# Patient Record
Sex: FEMALE | Race: WHITE | Employment: FULL TIME | URBAN - METROPOLITAN AREA
[De-identification: names, ages, dates, MRNs, and addresses within clinical notes are randomized per-mention and may not be internally consistent; named-entity substitution may affect disease eponyms.]

---

## 2017-01-12 ENCOUNTER — TELEPHONE (OUTPATIENT)
Dept: SURGERY | Age: 27
End: 2017-01-12

## 2017-01-12 NOTE — TELEPHONE ENCOUNTER
I called the patient and she said she is having reflux even with water. I asked her was she taking her Protonix and she said no, nor is she taking the Levsin or the Carafate. I told her to start taking her Prilosec for a few days to see if that helps and if not add back the Levsin and Carafate. Doc Miller NP also said for her to try Regalamos. Pt in agreement and will call back in a few days if this does not help.

## 2017-03-23 ENCOUNTER — OFFICE VISIT (OUTPATIENT)
Dept: SURGERY | Age: 27
End: 2017-03-23

## 2017-03-23 VITALS
SYSTOLIC BLOOD PRESSURE: 112 MMHG | HEART RATE: 67 BPM | HEIGHT: 66 IN | BODY MASS INDEX: 29.25 KG/M2 | WEIGHT: 182 LBS | DIASTOLIC BLOOD PRESSURE: 60 MMHG | TEMPERATURE: 98.3 F | OXYGEN SATURATION: 99 % | RESPIRATION RATE: 16 BRPM

## 2017-03-23 DIAGNOSIS — D64.9 ANEMIA, UNSPECIFIED: ICD-10-CM

## 2017-03-23 DIAGNOSIS — E55.9 VITAMIN D DEFICIENCY: ICD-10-CM

## 2017-03-23 DIAGNOSIS — Z98.84 S/P LAPAROSCOPIC SLEEVE GASTRECTOMY: ICD-10-CM

## 2017-03-23 DIAGNOSIS — E66.3 OVERWEIGHT: ICD-10-CM

## 2017-03-23 DIAGNOSIS — B37.2 YEAST DERMATITIS: ICD-10-CM

## 2017-03-23 DIAGNOSIS — E66.01 MORBID OBESITY DUE TO EXCESS CALORIES (HCC): Primary | ICD-10-CM

## 2017-03-23 DIAGNOSIS — E53.8 VITAMIN B12 DEFICIENCY: ICD-10-CM

## 2017-03-23 RX ORDER — PHENOL/SODIUM PHENOLATE
20 AEROSOL, SPRAY (ML) MUCOUS MEMBRANE DAILY
Qty: 30 TAB | Refills: 5 | Status: SHIPPED | OUTPATIENT
Start: 2017-03-23

## 2017-03-23 RX ORDER — NYSTATIN 100000 U/G
CREAM TOPICAL 2 TIMES DAILY
Qty: 15 G | Refills: 1 | Status: SHIPPED | OUTPATIENT
Start: 2017-03-23

## 2017-03-23 NOTE — MR AVS SNAPSHOT
Visit Information Date & Time Provider Department Dept. Phone Encounter #  
 3/23/2017  9:20 AM Samson Treviño NP [phone] [id number] Upcoming Health Maintenance Date Due DTaP/Tdap/Td series (1 - Tdap) 1/11/2009 PAP AKA CERVICAL CYTOLOGY 1/11/2009 INFLUENZA AGE 9 TO ADULT 8/1/2016 Allergies as of 3/23/2017  Review Complete On: 3/23/2017 By: Samson Treviño NP Severity Noted Reaction Type Reactions Codeine  09/13/2016    Itching Current Immunizations  Reviewed on 9/29/2016 No immunizations on file. Not reviewed this visit You Were Diagnosed With   
  
 Codes Comments Overweight    -  Primary ICD-10-CM: T03.8 ICD-9-CM: 278.02   
 BMI 29.0-29.9,adult     ICD-10-CM: P64.44 ICD-9-CM: V85.25 S/P laparoscopic sleeve gastrectomy     ICD-10-CM: B85.06 ICD-9-CM: V45.86 Vitamin D deficiency     ICD-10-CM: E55.9 ICD-9-CM: 268.9 Vitamin B12 deficiency     ICD-10-CM: E53.8 ICD-9-CM: 266.2 Anemia, unspecified     ICD-10-CM: D64.9 ICD-9-CM: 285.9 Yeast dermatitis     ICD-10-CM: B37.2 ICD-9-CM: 112.3 Vitals BP Pulse Temp Resp Height(growth percentile) Weight(growth percentile) 112/60 (BP 1 Location: Left arm, BP Patient Position: Sitting) 67 98.3 °F (36.8 °C) (Oral) 16 5' 6\" (1.676 m) 182 lb (82.6 kg) SpO2 BMI OB Status Smoking Status 99% 29.38 kg/m2 IUD Former Smoker Vitals History BMI and BSA Data Body Mass Index Body Surface Area  
 29.38 kg/m 2 1.96 m 2 Preferred Pharmacy Pharmacy Name Phone [phone] [facility] Your Updated Medication List  
  
   
This list is accurate as of: 3/23/17 10:33 AM.  Always use your most recent med list.  
  
  
  
  
 CALCIUM CITRATE + D PO Take  by mouth. * VITAMIN D2 PO Take  by mouth. * ergocalciferol 50,000 unit capsule Commonly known as:  ERGOCALCIFEROL Take 1 Cap by mouth every seven (7) days. Indications: VITAMIN D DEFICIENCY (HIGH DOSE THERAPY)  
  
 gabapentin 300 mg capsule Commonly known as:  NEURONTIN Take 600 mg by mouth three (3) times daily. hyoscyamine SL 0.125 mg SL tablet Commonly known as:  LEVSIN/SL  
1 Tab by SubLINGual route every four (4) hours as needed for Cramping. levonorgestrel 20 mcg/24 hr (5 years) IUD Commonly known as:  MIRENA  
1 Each by IntraUTERine route once. loratadine 10 mg tablet Commonly known as:  Higinio Hippo Take 10 mg by mouth daily as needed for Allergies. LORazepam 0.5 mg tablet Commonly known as:  ATIVAN Take 1 Tab by mouth every six (6) hours as needed (nausea and chest pressure/spasm). Max Daily Amount: 2 mg. MULTIVITAMIN PO Take  by mouth daily. Using Celebrate vitamins  
  
 nystatin topical cream  
Commonly known as:  MYCOSTATIN Apply  to affected area two (2) times a day. Omeprazole delayed release 20 mg tablet Commonly known as:  PRILOSEC D/R Take 1 Tab by mouth daily. ondansetron 4 mg disintegrating tablet Commonly known as:  ZOFRAN ODT Take 1 Tab by mouth every eight (8) hours as needed for Nausea. OTHER  
emergency vitamin for cold. sucralfate 100 mg/mL suspension Commonly known as:  Wanette Pickerel Take 10 mL by mouth four (4) times daily. VITAMIN B-12 1,000 mcg sublingual tablet Generic drug:  cyanocobalamin Take 1,000 mcg by mouth daily. * Notice: This list has 2 medication(s) that are the same as other medications prescribed for you. Read the directions carefully, and ask your doctor or other care provider to review them with you. Prescriptions Sent to Pharmacy Refills  
 nystatin (MYCOSTATIN) topical cream 1 Sig: Apply  to affected area two (2) times a day.   
 Class: Normal  
 Pharmacy: [id number] [facility], [address] [place] [address] [facility] Ph #: [phone] Route: Topical  
 Omeprazole delayed release (PRILOSEC D/R) 20 mg tablet 5 Sig: Take 1 Tab by mouth daily. Class: Normal  
 Pharmacy: [id number] [facility] [address], [address] [place] Ph #: [phone] Route: Oral  
  
We Performed the Following CBC W/O DIFF [33939 CPT(R)] METABOLIC PANEL, COMPREHENSIVE [22516 CPT(R)] PREALBUMIN [10563 CPT(R)] VITAMIN B12 [id number] CPT(R)] VITAMIN D, 25 HYDROXY [id number] CPT(R)] Patient Instructions Learning About Physical Activity What is physical activity? Physical activity is any kind of activity that gets your body moving. The types of physical activity that can help you get fit and stay healthy include: · Aerobic or \"cardio\" activities that make your heart beat faster and make you breathe harder, such as brisk walking, riding a bike, or running. Aerobic activities strengthen your heart and lungs and build up your endurance. · Strength training activities that make your muscles work against, or \"resist,\" something, such as lifting weights or doing push-ups. These activities help tone and strengthen your muscles. · Stretches that allow you to move your joints and muscles through their full range of motion. Stretching helps you be more flexible and avoid injury. What are the benefits of physical activity? Being active is one of the best things you can do to get fit and stay healthy. It helps you to: · Feel stronger and have more energy to do all the things you like to do. · Focus better at school or work and perform better in sports. · Feel, think, and sleep better. · Reach and stay at a healthy weight. · Lose fat and build lean muscle. · Lower your risk for serious health problems. · Keep your bones, muscles, and joints strong. Being fit lets you do more physical activity. And it lets you work out harder without as much effort. How can you make physical activity part of your life? Get at least 30 minutes of exercise on most days of the week. Walking is a good choice. You also may want to do other activities, such as running, swimming, cycling, or playing tennis or team sports. Pick activities that you likeones that make your heart beat faster, your muscles stronger, and your muscles and joints more flexible. If you find more than one thing you like doing, do them all. You don't have to do the same thing every day. Get your heart pumping every day. Any activity that makes your heart beat faster and keeps it at that rate for a while counts. Here are some great ways to get your heart beating faster: · Go for a brisk walk, run, or bike ride. · Go for a hike or swim. · Go in-line skating. · Play a game of touch football, basketball, or soccer. · Ride a bike. · Play tennis or racquetball. · Climb stairs. Even some household chores can be aerobicjust do them at a faster pace. Vacuuming, raking or mowing the lawn, sweeping the garage, and washing and waxing the car all can help get your heart rate up. Strengthen your muscles during the week. You don't have to lift heavy weights or grow big, bulky muscles to get stronger. Doing a few simple activities that make your muscles work against, or \"resist,\" something can help you get stronger. For example, you can: · Do push-ups or sit-ups, which use your own body weight as resistance. · Lift weights or dumbbells or use stretch bands at home or in a gym or community center. Stretch your muscles often. Stretching will help you as you become more active. It can help you stay flexible, loosen tight muscles, and avoid injury. It can also help improve your balance and posture and can be a great way to relax. Be sure to stretch the muscles you'll be using when you work out. It's best to warm your muscles slightly before you stretch them.  Walk or do some other light aerobic activity for a few minutes, and then start stretching. When you stretch your muscles: · Do it slowly. Stretching is not about going fast or making sudden movements. · Don't push or bounce during a stretch. · Hold each stretch for at least 15 to 30 seconds, if you can. You should feel a stretch in the muscle, but not pain. · Breathe out as you do the stretch. Then breathe in as you hold the stretch. Don't hold your breath. If you're worried about how more activity might affect your health, have a checkup before you start. Follow any special advice your doctor gives you for getting a smart start. Where can you learn more? Go to http://tonny-qi.info/. Enter F308 in the search box to learn more about \"Learning About Physical Activity. \" Current as of: May 27, 2016 Content Version: 11.1 © 8833-6055 [employer]. Care instructions adapted under license by [employer] (which disclaims liability or warranty for this information). If you have questions about a medical condition or this instruction, always ask your healthcare professional. [place] [address] any warranty or liability for your use of this information. Introducing [facility]! [facility] introduces Kamida patient portal. Now you can access parts of your medical record, email your doctor's office, and request medication refills online. 1. In your internet browser, go to [email or web address] [email or web address] 2. Click on the First Time User? Click Here link in the Sign In box. You will see the New Member Sign Up page. 3. Enter your Kamida Access Code exactly as it appears below. You will not need to use this code after youve completed the sign-up process. If you do not sign up before the expiration date, you must request a new code. · Kamida Access Code: [email or web address] Expires: 6/21/2017 10:33 AM 
 
 4. Enter the last four digits of your Social Security Number (xxxx) and Date of Birth (mm/dd/yyyy) as indicated and click Submit. You will be taken to the next sign-up page. 5. Create a IPexpert ID. This will be your IPexpert login ID and cannot be changed, so think of one that is secure and easy to remember. 6. Create a IPexpert password. You can change your password at any time. 7. Enter your Password Reset Question and Answer. This can be used at a later time if you forget your password. 8. Enter your e-mail address. You will receive e-mail notification when new information is available in [address] 9. Click Sign Up. You can now view and download portions of your medical record. 10. Click the Download Summary menu link to download a portable copy of your medical information. If you have questions, please visit the Frequently Asked Questions section of the IPexpert website. Remember, IPexpert is NOT to be used for urgent needs. For medical emergencies, dial 911. Now available from your iPhone and Android! Please provide this summary of care documentation to your next provider. Your primary care clinician is listed as Nida Newsome. If you have any questions after today's visit, please call [phone].

## 2017-03-23 NOTE — PROGRESS NOTES
1. Have you been to the ER, urgent care clinic since your last visit? Hospitalized since your last visit? No       2. Have you seen or consulted any other health care providers outside of the [address] [place] [address] since your last visit? Include any pap smears or colon screening.   No

## 2017-03-23 NOTE — PROGRESS NOTES
HISTORY OF PRESENT ILLNESS  Raul Villagomez is a 32 y.o. female with previous Sleeve gastrectomy 9/2016 . She has lost a total of 102 pounds since surgery. She  Has lost 27 lbs since the last ov. Body mass index is 29.38 kg/(m^2). . no nausea and no vomiting . + Acid reflux/heartburn, takes prilosec. Drinking  64 ounces of water daily. 50-60 grams protein intake daily. + BM's. Pt is walking for exercise. Dietary recall -    Breakfast- greek yogurt, smoothie and eggs  Lunch- leftovers- protein and vegetable  Dinner- protein and vegetable    She is not snacking between meals. .      Vitamins:  MVI : yes  Calcium : yes  B-Vit 12: yes  Taking biotin    Patient has an advanced directive: NOt on file. Ms. Rhonda River has a reminder for a \"due or due soon\" health maintenance. I have asked that she contact her primary care provider for follow-up on this health maintenance. Co-Morbid(s)     Resolved      Was anti coagulation initiated for presumed / confirmed DVT/PE? NO    Was an incisional hernia noted on exam?       NO      COMORBIDITY           GERD  (req.meds)        No on prilosec        Current Outpatient Prescriptions:     cyanocobalamin (VITAMIN B-12) 1,000 mcg sublingual tablet, Take 1,000 mcg by mouth daily. , Disp: , Rfl:     CALCIUM CITRATE/VITAMIN D3 (CALCIUM CITRATE + D PO), Take  by mouth., Disp: , Rfl:     ERGOCALCIFEROL, VITAMIN D2, (VITAMIN D2 PO), Take  by mouth., Disp: , Rfl:     Omeprazole delayed release (PRILOSEC D/R) 20 mg tablet, Take 1 Tab by mouth daily. , Disp: 30 Tab, Rfl: 5    MULTIVITAMIN PO, Take  by mouth daily. Using Celebrate vitamins, Disp: , Rfl:     ergocalciferol (ERGOCALCIFEROL) 50,000 unit capsule, Take 1 Cap by mouth every seven (7) days.  Indications: VITAMIN D DEFICIENCY (HIGH DOSE THERAPY), Disp: 4 Cap, Rfl: 2    levonorgestrel (MIRENA) 20 mcg/24 hr (5 years) IUD, 1 Each by IntraUTERine route once., Disp: , Rfl:     OTHER, emergency vitamin for cold., Disp: , Rfl:     ondansetron (ZOFRAN ODT) 4 mg disintegrating tablet, Take 1 Tab by mouth every eight (8) hours as needed for Nausea., Disp: 10 Tab, Rfl: 1    LORazepam (ATIVAN) 0.5 mg tablet, Take 1 Tab by mouth every six (6) hours as needed (nausea and chest pressure/spasm). Max Daily Amount: 2 mg., Disp: 30 Tab, Rfl: 0    sucralfate (CARAFATE) 100 mg/mL suspension, Take 10 mL by mouth four (4) times daily. , Disp: 414 mL, Rfl: 0    loratadine (CLARITIN) 10 mg tablet, Take 10 mg by mouth daily as needed for Allergies. , Disp: , Rfl:     hyoscyamine SL (LEVSIN/SL) 0.125 mg SL tablet, 1 Tab by SubLINGual route every four (4) hours as needed for Cramping., Disp: 30 Tab, Rfl: 0    gabapentin (NEURONTIN) 300 mg capsule, Take 600 mg by mouth three (3) times daily. , Disp: , Rfl:       Visit Vitals    /60 (BP 1 Location: Left arm, BP Patient Position: Sitting)    Pulse 67    Temp 98.3 °F (36.8 °C) (Oral)    Resp 16    Ht 5' 6\" (1.676 m)    Wt 182 lb (82.6 kg)    SpO2 99%    BMI 29.38 kg/m2     HPI    Review of Systems   Constitutional: Negative for chills and fever. Cardiovascular: Negative for chest pain. Gastrointestinal: Negative for abdominal pain, heartburn (on prilosec), nausea and vomiting. Skin: Positive for rash (She has had redness in her belly button and under pannus). Neurological: Negative for dizziness and headaches. Physical Exam   Constitutional: She is oriented to person, place, and time. She appears well-developed and well-nourished. HENT:   Head: Normocephalic. Neck: Normal range of motion. Neck supple. Cardiovascular: Normal rate and regular rhythm. Exam reveals no gallop and no friction rub. No murmur heard. Pulmonary/Chest: Effort normal and breath sounds normal.   Abdominal: Soft. Bowel sounds are normal. She exhibits no distension. There is no tenderness. No masses or hernias noted. Musculoskeletal: She exhibits no edema.    Neurological: She is alert and oriented to person, place, and time. Skin: Skin is warm and dry. Rash (redness noted in umbilcus) noted. Psychiatric: She has a normal mood and affect. ASSESSMENT and PLAN  Pardeep Celeste is a 32 y.o. female with previous Sleeve gastrectomy 9/2016 . She has lost a total of 102 pounds since surgery. She  Has lost 27 lbs since the last ov. Body mass index is 29.38 kg/(m^2)  Patient is to continue a high protein, low-fat, low-sugar diet for life. Take vitamins and minerals daily for life. Include protein at each meal.  Avoid high fat food items. Avoid \"added\" sugar. Chew well and eat slowly. Take 20-30 minutes to complete a meal.  Measure foods and read labels. Exercise daily. May want to increase exercise. Candiasis -umbilicus. Start nystatin as needed in umbilicus and under pannus. Follow up in 3 months. Refilled Prilosec  Check nutrition labs. Pt verbalized understanding and questions were answered to the best of my knowledge and ability. 30 minutes spent face to face with patient, >50% of time spent counseling.

## 2017-03-23 NOTE — PATIENT INSTRUCTIONS

## 2017-03-27 LAB
25(OH)D3+25(OH)D2 SERPL-MCNC: 134 NG/ML (ref 30–100)
ALBUMIN SERPL-MCNC: 5 G/DL (ref 3.5–5.5)
ALBUMIN/GLOB SERPL: 2.3 {RATIO} (ref 1.2–2.2)
ALP SERPL-CCNC: 89 IU/L (ref 39–117)
ALT SERPL-CCNC: 13 IU/L (ref 0–32)
AST SERPL-CCNC: 14 IU/L (ref 0–40)
BILIRUB SERPL-MCNC: 0.6 MG/DL (ref 0–1.2)
BUN SERPL-MCNC: 14 MG/DL (ref 6–20)
BUN/CREAT SERPL: 19 (ref 8–20)
CALCIUM SERPL-MCNC: 9.7 MG/DL (ref 8.7–10.2)
CHLORIDE SERPL-SCNC: 101 MMOL/L (ref 96–106)
CO2 SERPL-SCNC: 18 MMOL/L (ref 18–29)
CREAT SERPL-MCNC: 0.72 MG/DL (ref 0.57–1)
ERYTHROCYTE [DISTWIDTH] IN BLOOD BY AUTOMATED COUNT: 13.3 % (ref 12.3–15.4)
GLOBULIN SER CALC-MCNC: 2.2 G/DL (ref 1.5–4.5)
GLUCOSE SERPL-MCNC: 85 MG/DL (ref 65–99)
HCT VFR BLD AUTO: 41.9 % (ref 34–46.6)
HGB BLD-MCNC: 14.1 G/DL (ref 11.1–15.9)
MCH RBC QN AUTO: 30.1 PG (ref 26.6–33)
MCHC RBC AUTO-ENTMCNC: 33.7 G/DL (ref 31.5–35.7)
MCV RBC AUTO: 90 FL (ref 79–97)
PLATELET # BLD AUTO: 238 X10E3/UL (ref 150–379)
POTASSIUM SERPL-SCNC: 4.3 MMOL/L (ref 3.5–5.2)
PREALB SERPL-MCNC: 28 MG/DL (ref 14–35)
PROT SERPL-MCNC: 7.2 G/DL (ref 6–8.5)
RBC # BLD AUTO: 4.68 X10E6/UL (ref 3.77–5.28)
SODIUM SERPL-SCNC: 144 MMOL/L (ref 134–144)
VIT B12 SERPL-MCNC: 787 PG/ML (ref 211–946)
WBC # BLD AUTO: 10.5 X10E3/UL (ref 3.4–10.8)

## 2017-04-03 ENCOUNTER — TELEPHONE (OUTPATIENT)
Dept: SURGERY | Age: 27
End: 2017-04-03

## 2017-07-31 ENCOUNTER — TELEPHONE (OUTPATIENT)
Dept: SURGERY | Age: 27
End: 2017-07-31

## 2017-11-09 ENCOUNTER — OFFICE VISIT (OUTPATIENT)
Dept: SURGERY | Age: 27
End: 2017-11-09

## 2017-11-09 VITALS
BODY MASS INDEX: 23.06 KG/M2 | HEIGHT: 66 IN | DIASTOLIC BLOOD PRESSURE: 70 MMHG | RESPIRATION RATE: 16 BRPM | WEIGHT: 143.5 LBS | OXYGEN SATURATION: 98 % | SYSTOLIC BLOOD PRESSURE: 108 MMHG | HEART RATE: 79 BPM | TEMPERATURE: 98 F

## 2017-11-09 DIAGNOSIS — D64.9 ANEMIA, UNSPECIFIED TYPE: ICD-10-CM

## 2017-11-09 DIAGNOSIS — Z98.84 S/P LAPAROSCOPIC SLEEVE GASTRECTOMY: ICD-10-CM

## 2017-11-09 DIAGNOSIS — E66.01 MORBID OBESITY (HCC): Primary | ICD-10-CM

## 2017-11-09 DIAGNOSIS — E53.8 VITAMIN B12 DEFICIENCY: ICD-10-CM

## 2017-11-09 DIAGNOSIS — E55.9 VITAMIN D DEFICIENCY: ICD-10-CM

## 2017-11-09 NOTE — PROGRESS NOTES
1. Have you been to the ER, urgent care clinic since your last visit? Hospitalized since your last visit? No    2. Have you seen or consulted any other health care providers outside of the [address] [place] [address] since your last visit? Include any pap smears or colon screening. Sheridan Rosario  Body composition    female  32 y.o. Vitals:    11/09/17 1008   BP: 108/70   Pulse: 79   Resp: 16   Temp: 98 °F (36.7 °C)   TempSrc: Oral   SpO2: 98%   Weight: 143 lb 8 oz (65.1 kg)   Height: 5' 6\" (1.676 m)     Body mass index is 23.16 kg/(m^2). Today 11-9-17 5-26-16  Neck-  13 in. Neck- 17 in. Waist- 33 in. Waist- 56 in. Hips- 39.5 in. Hip- 47 in. Patient states she 'passed out' 2 weeks ago. H&P weight on 9/13/16 with Dr. Colette Owens weight was 286 pounds. Last office visit weight on 3/23/17 was 182 pounds.

## 2017-11-09 NOTE — PATIENT INSTRUCTIONS

## 2017-11-09 NOTE — PROGRESS NOTES
HISTORY OF PRESENT ILLNESS  Chrissy Kelly is a 32 y.o. female with previous Sleeve gastrectomy on 9/28/2016 . She has lost a total of 142.5 pounds since surgery. She  Has lost 38.5 lbs.  since the last ov. Body mass index is 23.16 kg/(m^2). . no nausea and no vomiting . + Acid reflux/heartburn, tums if needed. . Drinking  40 ounces of water daily. 50-60 grams protein intake daily. + BM's, mom. Pt is walking for exercise. She states that she passed out a few weeks ago. She feels that it is because she did not drink enough. She has always struggled with getting in enough fluid. She drink one G2 a day and this helps. Dietary recall -    Breakfast-egg quiche, low sugar oatmeal  Lunch- leftovers, deli meat with cheese  Dinner- protein and vegetable      She is snacking between meals; She is finding that she wants to snack. Vitamins:  MVI : yes  Calcium : yes  B-Vit 12: yes    Patient has an advanced directive: NO      Ms. Becka Newton has a reminder for a \"due or due soon\" health maintenance. I have asked that she contact her primary care provider for follow-up on this health maintenance. Co-Morbid(s)     Resolved      Was anti coagulation initiated for presumed / confirmed DVT/PE? NO    Was an incisional hernia noted on exam?       NO      COMORBIDITY     SLEE        GERD  (req.meds)          yes      Current Outpatient Prescriptions:     Omeprazole delayed release (PRILOSEC D/R) 20 mg tablet, Take 1 Tab by mouth daily. , Disp: 30 Tab, Rfl: 5    cyanocobalamin (VITAMIN B-12) 1,000 mcg sublingual tablet, Take 1,000 mcg by mouth daily. , Disp: , Rfl:     CALCIUM CITRATE/VITAMIN D3 (CALCIUM CITRATE + D PO), Take  by mouth., Disp: , Rfl:     MULTIVITAMIN PO, Take  by mouth daily. Using Celebrate vitamins, Disp: , Rfl:     ergocalciferol (ERGOCALCIFEROL) 50,000 unit capsule, Take 1 Cap by mouth every seven (7) days.  Indications: VITAMIN D DEFICIENCY (HIGH DOSE THERAPY), Disp: 4 Cap, Rfl: 2   levonorgestrel (MIRENA) 20 mcg/24 hr (5 years) IUD, 1 Each by IntraUTERine route once., Disp: , Rfl:     nystatin (MYCOSTATIN) topical cream, Apply  to affected area two (2) times a day., Disp: 15 g, Rfl: 1    ERGOCALCIFEROL, VITAMIN D2, (VITAMIN D2 PO), Take  by mouth., Disp: , Rfl:     OTHER, emergency vitamin for cold., Disp: , Rfl:     ondansetron (ZOFRAN ODT) 4 mg disintegrating tablet, Take 1 Tab by mouth every eight (8) hours as needed for Nausea., Disp: 10 Tab, Rfl: 1    LORazepam (ATIVAN) 0.5 mg tablet, Take 1 Tab by mouth every six (6) hours as needed (nausea and chest pressure/spasm). Max Daily Amount: 2 mg., Disp: 30 Tab, Rfl: 0    sucralfate (CARAFATE) 100 mg/mL suspension, Take 10 mL by mouth four (4) times daily. , Disp: 414 mL, Rfl: 0    loratadine (CLARITIN) 10 mg tablet, Take 10 mg by mouth daily as needed for Allergies. , Disp: , Rfl:     hyoscyamine SL (LEVSIN/SL) 0.125 mg SL tablet, 1 Tab by SubLINGual route every four (4) hours as needed for Cramping., Disp: 30 Tab, Rfl: 0    gabapentin (NEURONTIN) 300 mg capsule, Take 600 mg by mouth three (3) times daily. , Disp: , Rfl:       Visit Vitals    /70 (BP 1 Location: Left arm, BP Patient Position: Sitting)    Pulse 79    Temp 98 °F (36.7 °C) (Oral)    Resp 16    Ht 5' 6\" (1.676 m)    Wt 143 lb 8 oz (65.1 kg)    SpO2 98%    BMI 23.16 kg/m2     HPI    Review of Systems   Constitutional: Negative for chills and fever. Respiratory: Negative for shortness of breath. Cardiovascular: Negative for chest pain. Gastrointestinal: Negative for abdominal pain, heartburn, nausea and vomiting. Neurological: Positive for dizziness (at times). Negative for headaches. Physical Exam   Constitutional: She is oriented to person, place, and time. She appears well-developed and well-nourished. Cardiovascular: Normal rate and regular rhythm. Exam reveals no gallop and no friction rub. No murmur heard.   Pulmonary/Chest: Effort normal and breath sounds normal.   Abdominal: Soft. Bowel sounds are normal. She exhibits no distension. There is no tenderness. No masses or hernias noted   Musculoskeletal: She exhibits no edema. Neurological: She is alert and oriented to person, place, and time. Skin: Skin is warm and dry. Psychiatric: She has a normal mood and affect. ASSESSMENT and PLAN  Mary Vera is a 32 y.o. female with previous Sleeve gastrectomy on 9/28/2016 . She has lost a total of 142.5 pounds since surgery. She  Has lost 38.5 lbs.  since the last ov. Body mass index is 23.16 kg/(m^2). Patient is to continue a high protein, low-fat, low-sugar diet for life. Take vitamins and minerals daily for life. Include protein at each meal.  Avoid high fat food items. Avoid \"added\" sugar. Chew well and eat slowly. Take 20-30 minutes to complete a meal.  Measure foods and read labels. Exercise daily. Follow up 6 moths. Check nutrition labs. We discussed adding more sodium to her diet, ie pickles. Pt verbalized understanding and questions were answered to the best of my knowledge and ability.

## 2017-11-11 LAB
25(OH)D3+25(OH)D2 SERPL-MCNC: 59.5 NG/ML (ref 30–100)
ALBUMIN SERPL-MCNC: 4.6 G/DL (ref 3.5–5.5)
ALBUMIN/GLOB SERPL: 1.9 {RATIO} (ref 1.2–2.2)
ALP SERPL-CCNC: 56 IU/L (ref 39–117)
ALT SERPL-CCNC: 21 IU/L (ref 0–32)
AST SERPL-CCNC: 17 IU/L (ref 0–40)
BILIRUB SERPL-MCNC: 0.8 MG/DL (ref 0–1.2)
BUN SERPL-MCNC: 15 MG/DL (ref 6–20)
BUN/CREAT SERPL: 19 (ref 9–23)
CALCIUM SERPL-MCNC: 9.1 MG/DL (ref 8.7–10.2)
CHLORIDE SERPL-SCNC: 101 MMOL/L (ref 96–106)
CO2 SERPL-SCNC: 24 MMOL/L (ref 18–29)
CREAT SERPL-MCNC: 0.78 MG/DL (ref 0.57–1)
ERYTHROCYTE [DISTWIDTH] IN BLOOD BY AUTOMATED COUNT: 13.1 % (ref 12.3–15.4)
GFR SERPLBLD CREATININE-BSD FMLA CKD-EPI: 103 ML/MIN/1.73
GFR SERPLBLD CREATININE-BSD FMLA CKD-EPI: 119 ML/MIN/1.73
GLOBULIN SER CALC-MCNC: 2.4 G/DL (ref 1.5–4.5)
GLUCOSE SERPL-MCNC: 82 MG/DL (ref 65–99)
HCT VFR BLD AUTO: 39.7 % (ref 34–46.6)
HGB BLD-MCNC: 13.3 G/DL (ref 11.1–15.9)
MCH RBC QN AUTO: 30.8 PG (ref 26.6–33)
MCHC RBC AUTO-ENTMCNC: 33.5 G/DL (ref 31.5–35.7)
MCV RBC AUTO: 92 FL (ref 79–97)
PLATELET # BLD AUTO: 253 X10E3/UL (ref 150–379)
POTASSIUM SERPL-SCNC: 4.2 MMOL/L (ref 3.5–5.2)
PREALB SERPL-MCNC: 34 MG/DL (ref 14–35)
PROT SERPL-MCNC: 7 G/DL (ref 6–8.5)
RBC # BLD AUTO: 4.32 X10E6/UL (ref 3.77–5.28)
SODIUM SERPL-SCNC: 142 MMOL/L (ref 134–144)
VIT B12 SERPL-MCNC: 780 PG/ML (ref 211–946)
WBC # BLD AUTO: 8.7 X10E3/UL (ref 3.4–10.8)

## 2017-11-15 ENCOUNTER — DOCUMENTATION ONLY (OUTPATIENT)
Dept: SURGERY | Age: 27
End: 2017-11-15

## 2018-07-30 ENCOUNTER — TELEPHONE (OUTPATIENT)
Dept: SURGERY | Age: 28
End: 2018-07-30

## 2018-11-15 ENCOUNTER — TELEPHONE (OUTPATIENT)
Dept: SURGERY | Age: 28
End: 2018-11-15

## 2018-11-15 NOTE — TELEPHONE ENCOUNTER
Patient is currently pregnant and wants to check in with REINA Brown to see if there is anything she needs to do. She lives to far to come to the office but wanted to speak to her.

## 2018-11-15 NOTE — TELEPHONE ENCOUNTER
I called the patient and she wanted to know what vitamins to take, I checked with Citlali Cm NP. And she said for her to keep Vitamin B12, Vitamin D and take a Prenatal vitamin. I also told her she can see Isiah Singh our dietician if she would like, she just needs to call and schedule an appointment. Pt in agreement.

## 2019-07-30 ENCOUNTER — TELEPHONE (OUTPATIENT)
Dept: SURGERY | Age: 29
End: 2019-07-30

## 2020-07-30 ENCOUNTER — TELEPHONE (OUTPATIENT)
Dept: SURGERY | Age: 30
End: 2020-07-30

## 2023-05-26 RX ORDER — HYOSCYAMINE SULFATE 0.12 MG/1
0.12 TABLET SUBLINGUAL EVERY 4 HOURS PRN
COMMUNITY
Start: 2016-09-13

## 2023-05-26 RX ORDER — SUCRALFATE ORAL 1 G/10ML
1 SUSPENSION ORAL 4 TIMES DAILY
COMMUNITY
Start: 2016-10-17

## 2023-05-26 RX ORDER — OMEPRAZOLE 20 MG/1
20 TABLET, DELAYED RELEASE ORAL DAILY
COMMUNITY
Start: 2017-03-23

## 2023-05-26 RX ORDER — LORATADINE 10 MG/1
10 TABLET ORAL DAILY PRN
COMMUNITY

## 2023-05-26 RX ORDER — ONDANSETRON 4 MG/1
4 TABLET, ORALLY DISINTEGRATING ORAL EVERY 8 HOURS PRN
COMMUNITY
Start: 2016-10-25

## 2023-05-26 RX ORDER — LORAZEPAM 0.5 MG/1
0.5 TABLET ORAL EVERY 6 HOURS PRN
COMMUNITY
Start: 2016-10-18

## 2023-05-26 RX ORDER — GABAPENTIN 300 MG/1
600 CAPSULE ORAL 3 TIMES DAILY
COMMUNITY

## 2023-05-26 RX ORDER — ERGOCALCIFEROL 1.25 MG/1
50000 CAPSULE ORAL
COMMUNITY
Start: 2016-09-13

## 2023-05-26 RX ORDER — NYSTATIN 100000 U/G
CREAM TOPICAL 2 TIMES DAILY
COMMUNITY
Start: 2017-03-23